# Patient Record
Sex: FEMALE | URBAN - METROPOLITAN AREA
[De-identification: names, ages, dates, MRNs, and addresses within clinical notes are randomized per-mention and may not be internally consistent; named-entity substitution may affect disease eponyms.]

---

## 2019-05-15 ENCOUNTER — OFFICE VISIT (OUTPATIENT)
Dept: FAMILY MEDICINE CLINIC | Facility: CLINIC | Age: 19
End: 2019-05-15
Payer: MEDICAID

## 2019-05-15 VITALS
WEIGHT: 136.19 LBS | DIASTOLIC BLOOD PRESSURE: 60 MMHG | HEIGHT: 67 IN | SYSTOLIC BLOOD PRESSURE: 98 MMHG | HEART RATE: 62 BPM | OXYGEN SATURATION: 99 % | TEMPERATURE: 98 F | BODY MASS INDEX: 21.37 KG/M2 | RESPIRATION RATE: 14 BRPM

## 2019-05-15 DIAGNOSIS — Z30.09 ENCOUNTER FOR OTHER GENERAL COUNSELING OR ADVICE ON CONTRACEPTION: Primary | ICD-10-CM

## 2019-05-15 PROCEDURE — 87491 CHLMYD TRACH DNA AMP PROBE: CPT | Performed by: FAMILY MEDICINE

## 2019-05-15 PROCEDURE — 87591 N.GONORRHOEAE DNA AMP PROB: CPT | Performed by: FAMILY MEDICINE

## 2019-05-15 PROCEDURE — 99203 OFFICE O/P NEW LOW 30 MIN: CPT | Performed by: FAMILY MEDICINE

## 2019-05-15 NOTE — PROGRESS NOTES
[facility] [place]  PROGRESS NOTE  Chief Complaint:   Patient presents with:  Consult: Patient would like to talk about getting Nexplanon implant removed      HPI:   This is a 19year old female coming in as new patient. Pt with nexplanon.  Pt itching, skin lesion, or excessive skin dryness. CARDIOVASCULAR:  Denies chest pain, chest pressure, chest discomfort, palpitations, edema, dyspnea on exertion or at rest.  RESPIRATORY:  Denies shortness of breath, wheezing, cough or sputum.   Karen Macias bilterally, no rales/rhonchi/wheezing. CHEST: No tenderness. ABDOMEN:  Soft, nondistended, nontender  EXTREMITIES:  No edema  NEURO:  No deficit, normal gait  PSYCH:  Normal mood and affect.  Behavior is normal. Judgement and thought content are normal

## 2019-05-17 ENCOUNTER — TELEPHONE (OUTPATIENT)
Dept: FAMILY MEDICINE CLINIC | Facility: CLINIC | Age: 19
End: 2019-05-17

## 2019-05-29 ENCOUNTER — OFFICE VISIT (OUTPATIENT)
Dept: FAMILY MEDICINE CLINIC | Facility: CLINIC | Age: 19
End: 2019-05-29
Payer: MEDICAID

## 2019-05-29 VITALS
WEIGHT: 137 LBS | BODY MASS INDEX: 21.5 KG/M2 | SYSTOLIC BLOOD PRESSURE: 110 MMHG | HEART RATE: 74 BPM | TEMPERATURE: 98 F | DIASTOLIC BLOOD PRESSURE: 60 MMHG | HEIGHT: 67 IN | RESPIRATION RATE: 16 BRPM | OXYGEN SATURATION: 99 %

## 2019-05-29 DIAGNOSIS — Z30.46 ENCOUNTER FOR NEXPLANON REMOVAL: Primary | ICD-10-CM

## 2019-05-29 PROCEDURE — 11982 REMOVE DRUG IMPLANT DEVICE: CPT | Performed by: FAMILY MEDICINE

## 2019-05-31 NOTE — PROCEDURES
CHIEF COMPLAINT   Patient presents for removal of Nexplanon. HPI   Nexplanon has reached his expiration date. Patient does not desire to have a new one placed. Patient currently is not sexually active and does not desire any contraception.   Patient ha

## 2019-05-31 NOTE — PATIENT INSTRUCTIONS
Postoperative local wound care with Steri-Strips discussed with patient. Topical antibiotic ointment keeping area clean and dry. Recheck as needed.

## 2019-08-12 ENCOUNTER — OFFICE VISIT (OUTPATIENT)
Dept: FAMILY MEDICINE CLINIC | Facility: CLINIC | Age: 19
End: 2019-08-12
Payer: MEDICAID

## 2019-08-12 VITALS
OXYGEN SATURATION: 98 % | DIASTOLIC BLOOD PRESSURE: 60 MMHG | HEART RATE: 77 BPM | SYSTOLIC BLOOD PRESSURE: 108 MMHG | TEMPERATURE: 98 F

## 2019-08-12 DIAGNOSIS — N76.0 ACUTE VAGINITIS: Primary | ICD-10-CM

## 2019-08-12 PROCEDURE — 87491 CHLMYD TRACH DNA AMP PROBE: CPT | Performed by: NURSE PRACTITIONER

## 2019-08-12 PROCEDURE — 87480 CANDIDA DNA DIR PROBE: CPT | Performed by: NURSE PRACTITIONER

## 2019-08-12 PROCEDURE — 87660 TRICHOMONAS VAGIN DIR PROBE: CPT | Performed by: NURSE PRACTITIONER

## 2019-08-12 PROCEDURE — 87591 N.GONORRHOEAE DNA AMP PROB: CPT | Performed by: NURSE PRACTITIONER

## 2019-08-12 PROCEDURE — 87510 GARDNER VAG DNA DIR PROBE: CPT | Performed by: NURSE PRACTITIONER

## 2019-08-12 PROCEDURE — 99214 OFFICE O/P EST MOD 30 MIN: CPT | Performed by: NURSE PRACTITIONER

## 2019-08-12 NOTE — PROGRESS NOTES
Patient ID:   Laure Conteh  is a 19year old female    Allergies  No Known Allergies  Medications     No outpatient medications have been marked as taking for the 8/12/19 encounter (Office Visit) with AVANI Langford.     HPI:   Laure Conteh is lymphadenopathy.    Skin: warm and dry, no rash          ASSESSMENT/PLAN:   Acute vaginitis  (primary encounter diagnosis)      Patient Instructions   Discussed the causes of bacterial vaginosis, including bubble baths, douche, intercourse and oral sex, sce

## 2019-08-12 NOTE — PATIENT INSTRUCTIONS
Discussed the causes of bacterial vaginosis, including bubble baths, douche, intercourse and oral sex, scented soaps, thong underwear, or anything else that can disrupt the normal vaginal alfonso such as an antibiotic.   Eat more yogurt with active cultures a

## 2019-08-13 ENCOUNTER — TELEPHONE (OUTPATIENT)
Dept: FAMILY MEDICINE CLINIC | Facility: CLINIC | Age: 19
End: 2019-08-13

## 2019-08-13 LAB
C TRACH DNA SPEC QL NAA+PROBE: NEGATIVE
N GONORRHOEA DNA SPEC QL NAA+PROBE: NEGATIVE

## 2019-08-13 NOTE — TELEPHONE ENCOUNTER
----- Message from AVANI Song sent at 8/13/2019  9:34 AM CDT -----  Please notify patient that her vaginitis swab came back negative for yeast and bacterial vaginosis–however, it came back positive for trichomonas.   Trichomonas is a sexually tra

## 2019-08-13 NOTE — TELEPHONE ENCOUNTER
----- Message from AVANI Mcgrath sent at 8/13/2019  1:35 PM CDT -----  Please notify patient that gonorrhea and chlamydia is negative. Thank you.

## 2019-09-04 ENCOUNTER — OFFICE VISIT (OUTPATIENT)
Dept: FAMILY MEDICINE CLINIC | Facility: CLINIC | Age: 19
End: 2019-09-04
Payer: MEDICAID

## 2019-09-04 VITALS
SYSTOLIC BLOOD PRESSURE: 110 MMHG | RESPIRATION RATE: 14 BRPM | WEIGHT: 141.13 LBS | HEIGHT: 67 IN | BODY MASS INDEX: 22.15 KG/M2 | DIASTOLIC BLOOD PRESSURE: 62 MMHG | HEART RATE: 104 BPM | TEMPERATURE: 98 F

## 2019-09-04 DIAGNOSIS — A59.9 TRICHIMONIASIS: Primary | ICD-10-CM

## 2019-09-04 PROCEDURE — 99213 OFFICE O/P EST LOW 20 MIN: CPT | Performed by: NURSE PRACTITIONER

## 2019-09-04 PROCEDURE — 87510 GARDNER VAG DNA DIR PROBE: CPT | Performed by: NURSE PRACTITIONER

## 2019-09-04 PROCEDURE — 87480 CANDIDA DNA DIR PROBE: CPT | Performed by: NURSE PRACTITIONER

## 2019-09-04 PROCEDURE — 87660 TRICHOMONAS VAGIN DIR PROBE: CPT | Performed by: NURSE PRACTITIONER

## 2019-09-04 NOTE — PROGRESS NOTES
Patient ID:   Jered Quintero  is a 19year old female    Allergies  No Known Allergies  Medications     No outpatient medications have been marked as taking for the 9/4/19 encounter (Office Visit) with AVANI Vega.     HPI:   Jered Quintero is a tender, no masses . Cul de sac: Small amount of white discharge noted . Musculoskeletal: She exhibits no edema and no tenderness. Lymphatic:   No inguinal lymphadenopathy.    Skin: warm and dry, no rash          ASSESSMENT/PLAN:   Trichimoniasis  (prim

## 2019-09-05 ENCOUNTER — TELEPHONE (OUTPATIENT)
Dept: FAMILY MEDICINE CLINIC | Facility: CLINIC | Age: 19
End: 2019-09-05

## 2019-09-05 NOTE — TELEPHONE ENCOUNTER
----- Message from AVANI Hill sent at 9/5/2019 10:40 AM CDT -----  Please notify patient that her trichomonas came back negative–however, she did test positive for bacterial vaginosis.   This is the same treatment that she had before–Flagyl 500 m

## 2019-09-05 NOTE — TELEPHONE ENCOUNTER
Informed pt of her swab results. Pt is NOT having any sxs at this time. Pt will call if sxs develop. Pt expressed understanding but was somewhat confused.

## 2019-09-11 ENCOUNTER — TELEPHONE (OUTPATIENT)
Dept: FAMILY MEDICINE CLINIC | Facility: CLINIC | Age: 19
End: 2019-09-11

## 2019-09-11 RX ORDER — METRONIDAZOLE 500 MG/1
500 TABLET ORAL 2 TIMES DAILY
Qty: 14 TABLET | Refills: 0 | Status: SHIPPED | OUTPATIENT
Start: 2019-09-11 | End: 2019-09-18

## 2019-09-11 NOTE — TELEPHONE ENCOUNTER
Prescription for metronidazole 500 mg 1 by mouth twice a day for 7 days sent to [facility] in [place] please let patient know she should not drink any alcohol on this medication as it would cause her to vomit.

## 2019-09-11 NOTE — TELEPHONE ENCOUNTER
Informed pt that script was faxed to pharmacy. Pt was also instructed not to drink alcohol while on this med. Pt expressed understanding and thanks.

## 2019-11-25 ENCOUNTER — OFFICE VISIT (OUTPATIENT)
Dept: FAMILY MEDICINE CLINIC | Facility: CLINIC | Age: 19
End: 2019-11-25
Payer: MEDICAID

## 2019-11-25 VITALS
WEIGHT: 135.38 LBS | OXYGEN SATURATION: 99 % | TEMPERATURE: 98 F | HEART RATE: 88 BPM | HEIGHT: 67 IN | SYSTOLIC BLOOD PRESSURE: 108 MMHG | BODY MASS INDEX: 21.25 KG/M2 | RESPIRATION RATE: 18 BRPM | DIASTOLIC BLOOD PRESSURE: 64 MMHG

## 2019-11-25 DIAGNOSIS — J06.9 VIRAL UPPER RESPIRATORY TRACT INFECTION: Primary | ICD-10-CM

## 2019-11-25 PROCEDURE — 99213 OFFICE O/P EST LOW 20 MIN: CPT | Performed by: NURSE PRACTITIONER

## 2019-11-25 NOTE — PROGRESS NOTES
HPI:    Patient ID: Nupur Sons is a 22year old female. HPI     Lost voice and congested with a little cough since a week ago. Daughter has been sick for the past month. No medications tried. Review of Systems   Constitutional: Negative. ASSESSMENT/PLAN:   Viral upper respiratory tract infection  (primary encounter diagnosis)    No orders of the defined types were placed in this encounter.       Meds This Visit:  Requested Prescriptions      No prescriptions requested or ordered in this

## 2019-11-29 NOTE — PATIENT INSTRUCTIONS
Take acetaminophen or ibuprofen for fever/discomfort  Drink plenty of fluids, warm liquids  Decongestants for congestion  Expectorant and/or cough suppressant  Use saline drops as needed or use neti pot/rinse (with distilled water)  Use cool mist vaporizer

## 2020-01-16 ENCOUNTER — OFFICE VISIT (OUTPATIENT)
Dept: FAMILY MEDICINE CLINIC | Facility: CLINIC | Age: 20
End: 2020-01-16
Payer: MEDICAID

## 2020-01-16 VITALS
SYSTOLIC BLOOD PRESSURE: 102 MMHG | DIASTOLIC BLOOD PRESSURE: 60 MMHG | OXYGEN SATURATION: 99 % | WEIGHT: 136 LBS | HEIGHT: 67 IN | BODY MASS INDEX: 21.35 KG/M2 | HEART RATE: 67 BPM | TEMPERATURE: 99 F

## 2020-01-16 DIAGNOSIS — J03.90 ACUTE ERYTHEMATOUS TONSILLITIS: Primary | ICD-10-CM

## 2020-01-16 DIAGNOSIS — H92.01 ACUTE EAR PAIN, RIGHT: ICD-10-CM

## 2020-01-16 LAB
CONTROL LINE PRESENT WITH A CLEAR BACKGROUND (YES/NO): YES YES/NO
KIT LOT #: NORMAL NUMERIC
STREP GRP A CUL-SCR: NEGATIVE

## 2020-01-16 PROCEDURE — 87880 STREP A ASSAY W/OPTIC: CPT | Performed by: NURSE PRACTITIONER

## 2020-01-16 PROCEDURE — 99213 OFFICE O/P EST LOW 20 MIN: CPT | Performed by: NURSE PRACTITIONER

## 2020-01-16 PROCEDURE — 87081 CULTURE SCREEN ONLY: CPT | Performed by: NURSE PRACTITIONER

## 2020-01-16 NOTE — PATIENT INSTRUCTIONS
Rapid strep negative, culture pending. Use drops for the ear pain. Ok to take tylenol or ibuprofen for the pain. Return to clinic if not improving or if worsens.

## 2020-01-16 NOTE — PROGRESS NOTES
HPI:    Patient ID: Travis Campbell is a 22year old female. HPI     Present with daughter who has a cough with complaints of right ear pain. Occasional cough, every now and then. Nasal congestion more in the morning. This has been for one day.    No fe to person, place, and time. Skin: Skin is warm and dry. Psychiatric: She has a normal mood and affect. Her behavior is normal. Judgment and thought content normal.   Nursing note and vitals reviewed.              ASSESSMENT/PLAN:   Acute erythematous to

## 2020-01-18 ENCOUNTER — TELEPHONE (OUTPATIENT)
Dept: FAMILY MEDICINE CLINIC | Facility: CLINIC | Age: 20
End: 2020-01-18

## 2020-01-18 NOTE — TELEPHONE ENCOUNTER
----- Message from AVANI Madera sent at 1/18/2020 10:13 AM CST -----  Please let patient know the strep culture is negative. Thank you.

## 2023-02-16 ENCOUNTER — PATIENT OUTREACH (OUTPATIENT)
Dept: CASE MANAGEMENT | Age: 23
End: 2023-02-16

## 2023-02-16 NOTE — PROCEDURES
The office order for PCP request is Approved and finalized on February 16, 2023.     Thanks,  [facility] [employer]